# Patient Record
(demographics unavailable — no encounter records)

---

## 2025-03-18 NOTE — ASSESSMENT
[FreeTextEntry1] : Impression: Habit cough, nonallergic rhinitis, possible vitamin D deficiency  Reviewed chest x-ray and labs checked.  Discussed the fact that often a child who develops a habit cough can have a recurrence.  Behavior modification techniques were suggested to control this if this recurs.  Nonallergic rhinitis: Suggested keeping a record of the number of times she clears her throat for a week.  Suggested then trying cetirizine at bedtime for a week to see if this decreases frequency of the child clearing her throat.  Occasionally postnasal drainage can trigger episodes of habit cough.  Possible vitamin D deficiency: 25-hydroxy vitamin D level is being checked.  Over 50% of time was spent in counseling.  I asked mother to bring her back for a follow-up visit in 6 weeks.  Dictation generated through [employer] [facility]. Note not proofed and edited.

## 2025-03-18 NOTE — HISTORY OF PRESENT ILLNESS
[FreeTextEntry1] : This 6-1/2-year-old was seen for evaluation and management of her respiratory problems.  Towards the end of December 2024 she developed a cold associated with coughing.  She coughs both during the day and at night for a week.  After this the nighttime cough resolved but she continued to have a cough during the daytime.  It was constant.  There was increased cough and shortness of breath with indoor activity.  She did not remain nasally congested.  She had an otolaryngology evaluation and had nasal endoscopy.  She was treated for reflux but this was discontinued in a week.  She continued to cough in January and February.  Labs showed negative mycoplasma IgG titers.  WBC 4.5 with decreased absolute neutrophils at 26%.  Respiratory allergy panel by the ImmunoCAP technique with IgE of 19.  Testing was negative.  Chest x-ray with increased peribronchial markings January 2025. She is scheduled for neurology evaluation to evaluate for the presence of tics. She has never been hospitalized, seen in the emergency room or operated on.  She was not on any routine medications.  She does not drink milk.  Her bowel movements are normal.  At present she intermittently clears her throat.  There is no increase in cough with activity.  There were no other family members coughing when she was coughing.

## 2025-03-18 NOTE — REVIEW OF SYSTEMS
[Nl] : Hematologic/Lymphatic [Urgency] : no feelings of urinary urgency [Dysuria] : no dysuria [Sleep Disturbances] : ~T no sleep disturbances [Hyperactive] : no hyperactive behavior [Failure To Thrive] : no failure to thrive [Short Stature] : short stature was not noted [FreeTextEntry4] : Clearing throat intermittently

## 2025-03-18 NOTE — PHYSICAL EXAM
[Alert] : ~L alert [Active] : active [No Allergic Shiners] : no allergic shiners [No Drainage] : no drainage [No Conjunctivitis] : no conjunctivitis [Tympanic Membranes Clear] : tympanic membranes were clear [No Nasal Drainage] : no nasal drainage [No Polyps] : no polyps [No Sinus Tenderness] : no sinus tenderness [No Oral Pallor] : no oral pallor [No Oral Cyanosis] : no oral cyanosis [No Exudates] : no exudates [Tonsil Size ___] : tonsil size [unfilled] [No Tonsillar Enlargement] : no tonsillar enlargement [No Stridor] : no stridor [Absence Of Retractions] : absence of retractions [Symmetric] : symmetric [Good Expansion] : good expansion [No Acc Muscle Use] : no accessory muscle use [Good aeration to bases] : good aeration to bases [Equal Breath Sounds] : equal breath sounds bilaterally [No Crackles] : no crackles [No Rhonchi] : no rhonchi [No Wheezing] : no wheezing [Normal Sinus Rhythm] : normal sinus rhythm [No Heart Murmur] : no heart murmur [Soft, Non-Tender] : soft, non-tender [No Hepatosplenomegaly] : no hepatosplenomegaly [Non Distended] : was not ~L distended [Abdomen Mass (___ Cm)] : no abdominal mass palpated [Abdomen Hernia] : no hernia was discovered [Full ROM] : full range of motion [No Clubbing] : no clubbing [Capillary Refill < 2 secs] : capillary refill less than two seconds [No Cyanosis] : no cyanosis [No Petechiae] : no petechiae [No Kyphoscoliosis] : no kyphoscoliosis [No Contractures] : no contractures [Abnormal Walk] : normal gait [No Birth Marks] : no birth marks [No Rashes] : no rashes [No Skin Ulcers] : no skin ulcers [FreeTextEntry1] : Moderately developed and nourished [FreeTextEntry5] : Pharynx with drainage

## 2025-03-18 NOTE — IMPRESSION
[Spirometry] : Spirometry [Normal Spirometry] : spirometry normal [FreeTextEntry1] : Spirometry normal with an FEV1 by FVC of 97% and FEF 25 to 75% of 123% predicted.  Exhaled nitric oxide 7

## 2025-06-06 NOTE — ASSESSMENT
[FreeTextEntry1] : 6 year girl with history consistent with transient vocal tics. I discussed long term prognosis with Mom including tendency of tics to wax and wane until typically resolve in Puberty. Best management is not to draw attention to them as anxiety can cause them to increase in frequency. I discussed criteria for which medication can be trialed.   Physical exam is nonfocal so further neurologic testing not required at this time. Follow up if needed

## 2025-06-06 NOTE — HISTORY OF PRESENT ILLNESS
[FreeTextEntry1] : Dayna presents for evaluation of uncontrolled vocalizations.  Mom states that this was first noted about 4-1/2 years ago.  These episodes have come and gone over the years including months when these vocalizations are not heard.  Episodes are described as throat clearing or gulping sound.  Mom does notice it more when Dayna is involved in quiet activity.  Dayna states that often times she is not aware she is making the sounds.  When she is aware and tries not to make the sound she states it will happen anyway.  She feels that she is able to control them during school but they do come on frequently when she is at home.  She has not had a history of any repetitive movements.  Mom also notes that these episodes tend to get exacerbated and worsen whenever Dayna has a cold.

## 2025-06-06 NOTE — PHYSICAL EXAM
[Well-appearing] : well-appearing [Normocephalic] : normocephalic [No dysmorphic facial features] : no dysmorphic facial features [No ocular abnormalities] : no ocular abnormalities [Neck supple] : neck supple [Lungs clear] : lungs clear [Heart sounds regular in rate and rhythm] : heart sounds regular in rate and rhythm [Soft] : soft [No organomegaly] : no organomegaly [No abnormal neurocutaneous stigmata or skin lesions] : no abnormal neurocutaneous stigmata or skin lesions [Straight] : straight [No estela or dimples] : no estela or dimples [No deformities] : no deformities [Alert] : alert [Well related, good eye contact] : well related, good eye contact [Conversant] : conversant [Normal speech and language] : normal speech and language [Follows instructions well] : follows instructions well [VFF] : VFF [Pupils reactive to light and accommodation] : pupils reactive to light and accommodation [Full extraocular movements] : full extraocular movements [Saccadic and smooth pursuits intact] : saccadic and smooth pursuits intact [No nystagmus] : no nystagmus [Normal facial sensation to light touch] : normal facial sensation to light touch [No facial asymmetry or weakness] : no facial asymmetry or weakness [Gross hearing intact] : gross hearing intact [Equal palate elevation] : equal palate elevation [Good shoulder shrug] : good shoulder shrug [Normal tongue movement] : normal tongue movement [Midline tongue, no fasciculations] : midline tongue, no fasciculations [Normal axial and appendicular muscle tone] : normal axial and appendicular muscle tone [Gets up on table without difficulty] : gets up on table without difficulty [No pronator drift] : no pronator drift [Normal finger tapping and fine finger movements] : normal finger tapping and fine finger movements [No abnormal involuntary movements] : no abnormal involuntary movements [5/5 strength in proximal and distal muscles of arms and legs] : 5/5 strength in proximal and distal muscles of arms and legs [Walks and runs well] : walks and runs well [Able to do deep knee bend] : able to do deep knee bend [Able to walk on heels] : able to walk on heels [Able to walk on toes] : able to walk on toes [2+ biceps] : 2+ biceps [Triceps] : triceps [Knee jerks] : knee jerks [Localizes LT and temperature] : localizes LT and temperature [Good walking balance] : good walking balance [Normal gait] : normal gait

## 2025-06-25 NOTE — ASSESSMENT
[FreeTextEntry1] : Impression: Mild persistent bronchial asthma, nonallergic rhinitis, vitamin D insufficiency, vocal tic disorder. Mild persistent bronchial asthma: Results of exhaled nitric oxide testing discussed. Do feel that she has mild asthma as well as a tic disorder.  She has recurrent sick visits.  As she is likely to do well in the summer, suggested discontinuing fluticasone 44 and restarting this towards the end of August.  Fluticasone 44 is to be started 2 puffs twice daily with a spacer and mask.  Albuterol is to be administered every 4 hours as needed.  Albuterol should not be used when she has the vocal tic.  It does not help.  Nonallergic rhinitis: Cetirizine is to be administered as needed.  Vitamin D insufficiency: Vitamin D3 prescribed 2000 international units daily.  Results of testing discussed. Vocal tic: This appears to be occurring intermittently for a few weeks each time she develops a viral infection. Over 50% of time was spent in counseling.  I asked mother to bring her back for a follow-up visit in 4 months.  Dictation generated through [employer] [facility]. Note not proofed and edited.

## 2025-06-25 NOTE — REASON FOR VISIT
[Routine Follow-Up] : a routine follow-up visit for [Asthma/RAD] : asthma/RAD [Cough] : cough [Mother] : mother

## 2025-06-25 NOTE — SOCIAL HISTORY
[Parent(s)] : parent(s) [Sister] : sister [None] : none [Grade:  _____] : Grade: [unfilled] [Smokers in Household] : there are no smokers in the home

## 2025-06-25 NOTE — HISTORY OF PRESENT ILLNESS
[FreeTextEntry1] : This 6-1/2-year-old was seen for a follow-up visit.  She had a neurology evaluation and was diagnosed to have a vocal tic.  She does not have the take all the time.  When she catches a cold she will have the take for a while.  2 weeks prior to this visit she developed a fever, sore throat and started trying to clear her throat.  She does this 2-3 times a day.  Appears to be increased when she is watching television.  There is no change with activity.  It is also worse when she is upset.  She was receiving fluticasone 44 2 puffs twice daily with a spacer and mask.  When I saw her in March 2025, she had a dry cough not audible at night with normal spirometry I diagnosed a habit cough.  According to mother the cough decreased significantly but then she was influenza + April 2025.  After thism she had had increased wet cough as well as a runny nose.  She was coughing at night once a week.  There is no significant increase with activity but she does continue to cough with activity.  She drinks limited amounts of milk.  Towards the end of December 2024 she developed a cold associated with coughing.  She coughed both during the day and at night for a week.  After this the nighttime cough resolved but she continued to have a cough during the daytime.  It was constant.  There was increased cough and shortness of breath with indoor activity.  She did not remain nasally congested.  She had an otolaryngology evaluation and had nasal endoscopy.  She was treated for reflux but this was discontinued in a week.  She continued to cough in January and February 2025.  Labs showed negative mycoplasma IgG titers.  WBC 4.5 with decreased absolute neutrophils at 26%.  Respiratory allergy panel by the ImmunoCAP technique with IgE of 19.  Testing was negative.  Chest x-ray with increased peribronchial markings January 2025.  She has never been hospitalized, seen in the emergency room or operated on.      Her bowel movements are normal.   There is no increase in cough with activity.

## 2025-06-25 NOTE — REVIEW OF SYSTEMS
[NI] : Allergic [Nl] : Hematologic/Lymphatic [Frequent URIs] : frequent upper respiratory infections [Cough] : cough [Snoring] : no snoring [Apnea] : no apnea [Restlessness] : no restlessness [Daytime Sleepiness] : no daytime sleepiness [Daytime Hyperactivity] : no daytime hyperactivity [Voice Changes] : no voice changes [Frequent Croup] : no frequent croup [Chronic Hoarseness] : no chronic hoarseness [Rhinorrhea] : no rhinorrhea [Nasal Congestion] : no nasal congestion [Sinus Problems] : no sinus problems [Postnasl Drip] : no postnasal drip [Epistaxis] : no epistaxis [Recurrent Ear Infections] : no recurrent ear infections [Recurrent Sinus Infections] : no recurrent sinus infections [Recurrent Throat Infections] : no recurrent throat infections [Tachypnea] : not tachypneic [Wheezing] : no wheezing [Shortness of Breath] : no shortness of breath [Bronchitis] : no bronchitis [Pneumonia] : no pneumonia [Hemoptysis] : no hemoptysis [Sputum] : no sputum [Chronically Infected with ___] : no chronic infections [Urgency] : no feelings of urinary urgency [Dysuria] : no dysuria [Sleep Disturbances] : ~T no sleep disturbances [Hyperactive] : no hyperactive behavior [Failure To Thrive] : no failure to thrive [Short Stature] : short stature was not noted [FreeTextEntry4] : Clearing throat intermittently

## 2025-06-25 NOTE — PHYSICAL EXAM
[Alert] : ~L alert [Active] : active [No Allergic Shiners] : no allergic shiners [No Drainage] : no drainage [No Conjunctivitis] : no conjunctivitis [Tympanic Membranes Clear] : tympanic membranes were clear [No Polyps] : no polyps [No Sinus Tenderness] : no sinus tenderness [No Oral Pallor] : no oral pallor [No Oral Cyanosis] : no oral cyanosis [No Exudates] : no exudates [Tonsil Size ___] : tonsil size [unfilled] [No Tonsillar Enlargement] : no tonsillar enlargement [No Stridor] : no stridor [Absence Of Retractions] : absence of retractions [Symmetric] : symmetric [Good Expansion] : good expansion [No Acc Muscle Use] : no accessory muscle use [Good aeration to bases] : good aeration to bases [Equal Breath Sounds] : equal breath sounds bilaterally [No Crackles] : no crackles [No Rhonchi] : no rhonchi [No Wheezing] : no wheezing [Normal Sinus Rhythm] : normal sinus rhythm [No Heart Murmur] : no heart murmur [Soft, Non-Tender] : soft, non-tender [No Hepatosplenomegaly] : no hepatosplenomegaly [Non Distended] : was not ~L distended [Abdomen Mass (___ Cm)] : no abdominal mass palpated [Abdomen Hernia] : no hernia was discovered [Full ROM] : full range of motion [No Clubbing] : no clubbing [Capillary Refill < 2 secs] : capillary refill less than two seconds [No Cyanosis] : no cyanosis [No Petechiae] : no petechiae [No Kyphoscoliosis] : no kyphoscoliosis [No Contractures] : no contractures [Abnormal Walk] : normal gait [No Birth Marks] : no birth marks [No Rashes] : no rashes [No Skin Ulcers] : no skin ulcers [No Nasal Drainage] : no nasal drainage [No Postnasal Drip] : no postnasal drip [FreeTextEntry1] : Moderately developed and nourished